# Patient Record
Sex: FEMALE | Race: WHITE | URBAN - METROPOLITAN AREA
[De-identification: names, ages, dates, MRNs, and addresses within clinical notes are randomized per-mention and may not be internally consistent; named-entity substitution may affect disease eponyms.]

---

## 2024-09-30 RX ORDER — MIRTAZAPINE 7.5 MG/1
7.5 TABLET, FILM COATED ORAL DAILY
Qty: 90 TABLET | Refills: 3 | OUTPATIENT
Start: 2024-09-30

## 2025-06-16 ENCOUNTER — TELEPHONE (OUTPATIENT)
Facility: CLINIC | Age: 80
End: 2025-06-16

## 2025-06-16 NOTE — TELEPHONE ENCOUNTER
Spoke with patient and scheduled appointment per Dr. Esquivel request via Secure Chat. Provided new address information and advised to bring in new ID and insurance card upon arrival. Patient expressed understanding.

## 2025-06-17 ENCOUNTER — OFFICE VISIT (OUTPATIENT)
Facility: CLINIC | Age: 80
End: 2025-06-17

## 2025-06-17 VITALS — DIASTOLIC BLOOD PRESSURE: 68 MMHG | HEART RATE: 71 BPM | WEIGHT: 122 LBS | SYSTOLIC BLOOD PRESSURE: 100 MMHG

## 2025-06-17 DIAGNOSIS — R55 NEAR SYNCOPE: ICD-10-CM

## 2025-06-17 DIAGNOSIS — R55 SYNCOPE AND COLLAPSE: Primary | ICD-10-CM

## 2025-06-17 PROBLEM — M17.12 OSTEOARTHRITIS OF LEFT KNEE: Status: ACTIVE | Noted: 2019-10-11

## 2025-06-17 PROBLEM — H93.19 TINNITUS: Status: ACTIVE | Noted: 2020-01-08

## 2025-06-17 PROBLEM — M20.42 ACQUIRED HAMMER TOE OF LEFT FOOT: Status: ACTIVE | Noted: 2019-10-11

## 2025-06-17 PROBLEM — F41.9 ANXIETY: Status: ACTIVE | Noted: 2018-05-10

## 2025-06-17 PROBLEM — G89.29 CHRONIC MIDLINE LOW BACK PAIN WITHOUT SCIATICA: Status: ACTIVE | Noted: 2021-03-03

## 2025-06-17 PROBLEM — M54.50 CHRONIC MIDLINE LOW BACK PAIN WITHOUT SCIATICA: Status: ACTIVE | Noted: 2021-03-03

## 2025-06-17 PROBLEM — N81.10 CYSTOCELE WITH RECTOCELE: Status: ACTIVE | Noted: 2018-03-22

## 2025-06-17 PROBLEM — E78.00 PURE HYPERCHOLESTEROLEMIA: Status: ACTIVE | Noted: 2018-05-11

## 2025-06-17 PROBLEM — M85.859 OSTEOPENIA OF HIP: Status: ACTIVE | Noted: 2019-07-10

## 2025-06-17 PROBLEM — M81.0 AGE RELATED OSTEOPOROSIS: Status: ACTIVE | Noted: 2018-05-11

## 2025-06-17 PROBLEM — M17.0 ARTHRITIS OF BOTH KNEES: Status: ACTIVE | Noted: 2019-10-11

## 2025-06-17 PROBLEM — N81.6 CYSTOCELE WITH RECTOCELE: Status: ACTIVE | Noted: 2018-03-22

## 2025-06-17 PROBLEM — R33.9 INCOMPLETE EMPTYING OF BLADDER: Status: ACTIVE | Noted: 2018-03-22

## 2025-06-17 PROBLEM — R35.0 URINATION FREQUENCY: Status: ACTIVE | Noted: 2018-03-22

## 2025-06-17 PROBLEM — H81.10 BPV (BENIGN POSITIONAL VERTIGO): Status: ACTIVE | Noted: 2020-01-08

## 2025-06-17 PROBLEM — M19.039 LOCALIZED PRIMARY OSTEOARTHRITIS OF WRIST: Status: ACTIVE | Noted: 2019-10-11

## 2025-06-17 PROCEDURE — 93005 ELECTROCARDIOGRAM TRACING: CPT | Performed by: INTERNAL MEDICINE

## 2025-06-17 PROCEDURE — 99202 OFFICE O/P NEW SF 15 MIN: CPT

## 2025-06-17 PROCEDURE — 1126F AMNT PAIN NOTED NONE PRSNT: CPT | Performed by: INTERNAL MEDICINE

## 2025-06-17 PROCEDURE — 1036F TOBACCO NON-USER: CPT | Performed by: INTERNAL MEDICINE

## 2025-06-17 PROCEDURE — 99203 OFFICE O/P NEW LOW 30 MIN: CPT | Performed by: INTERNAL MEDICINE

## 2025-06-17 PROCEDURE — 1159F MED LIST DOCD IN RCRD: CPT | Performed by: INTERNAL MEDICINE

## 2025-06-17 RX ORDER — ROSUVASTATIN CALCIUM 10 MG/1
10 TABLET, COATED ORAL NIGHTLY
COMMUNITY
Start: 2025-05-29

## 2025-06-17 RX ORDER — CYCLOSPORINE 0.5 MG/ML
1 EMULSION OPHTHALMIC EVERY 12 HOURS
COMMUNITY

## 2025-06-17 RX ORDER — CYANOCOBALAMIN (VITAMIN B-12) 500 MCG
400 TABLET ORAL
COMMUNITY

## 2025-06-17 RX ORDER — PSEUDOEPHEDRINE HCL 30 MG
500 TABLET ORAL DAILY
COMMUNITY

## 2025-06-17 RX ORDER — CLONAZEPAM 0.5 MG/1
0.5 TABLET ORAL 2 TIMES DAILY PRN
COMMUNITY
Start: 2025-04-08

## 2025-06-17 RX ORDER — MIRTAZAPINE 7.5 MG/1
1 TABLET, FILM COATED ORAL
COMMUNITY
Start: 2025-04-03

## 2025-06-17 ASSESSMENT — ENCOUNTER SYMPTOMS
DEPRESSION: 0
LOSS OF SENSATION IN FEET: 0
OCCASIONAL FEELINGS OF UNSTEADINESS: 0

## 2025-06-17 ASSESSMENT — PAIN SCALES - GENERAL: PAINLEVEL_OUTOF10: 0-NO PAIN

## 2025-06-17 ASSESSMENT — PATIENT HEALTH QUESTIONNAIRE - PHQ9
1. LITTLE INTEREST OR PLEASURE IN DOING THINGS: NOT AT ALL
2. FEELING DOWN, DEPRESSED OR HOPELESS: NOT AT ALL
SUM OF ALL RESPONSES TO PHQ9 QUESTIONS 1 AND 2: 0

## 2025-06-17 NOTE — ASSESSMENT & PLAN NOTE
Near syncope and panic attacks  History as noted above.  I would agree with using low-dose beta-blockers to help with patient.  I Have advised her to increase her salt intake to offset the hypotension associated with beta-blockers.  Additionally I would like to assess a TSH and cortisol level as these have not been assessed over the past

## 2025-06-17 NOTE — PROGRESS NOTES
No chief complaint on file.       The patient is a 79-year-old female with a history of osteoporosis and near syncopal episodes.  She was hospitalized at [facility] earlier this year in the setting of a bonafide syncopal episode with trauma to her head.  There is no significant pathology noted on extensive workup while she was in the hospital although there was a question of atrial fibrillation.  This was in the setting of significant tremoring and no clear evidence of atrial fibrillation based on extensive review of her records.  Since her episode earlier this year she has had significant anxiety and palpitations associated with her anxiety.  She also has significant fatigue and lack of stamina fall.  She has had extensive workup and has seen a psychiatrist as well who more recently recommended propranolol for therapy of what he believes is panic attacks post her trauma.  The patient notes that she is extremely sensitive to medications and wishes to take the lowest dose possible has been taking clonazepam with some degree of benefit although incomplete.  She has had a normal echocardiogram.           Active Ambulatory Problems     Diagnosis Date Noted    No Active Ambulatory Problems     Resolved Ambulatory Problems     Diagnosis Date Noted    No Resolved Ambulatory Problems     Past Medical History:   Diagnosis Date    Age-related osteoporosis without current pathological fracture     Benign paroxysmal vertigo, unspecified ear 06/01/2016    Personal history of other diseases of the nervous system and sense organs 01/09/2014    Personal history of other diseases of the nervous system and sense organs     Personal history of other specified conditions 05/03/2016    Personal history of other specified conditions 05/03/2017        Review of Systems   All other systems reviewed and are negative.         No current outpatient medications    Objective     There were no vitals filed for this visit.     Vitals and nursing note  reviewed.   Constitutional:       Appearance: Healthy appearance.   HENT:    Mouth/Throat:      Pharynx: Oropharynx is clear.   Pulmonary:      Effort: Pulmonary effort is normal.      Breath sounds: Normal breath sounds.   Cardiovascular:      PMI at left midclavicular line. Normal rate. Regular rhythm. Normal S1. Normal S2.       Murmurs: There is no murmur.      No gallop.  No click. No rub.   Pulses:     Intact distal pulses.   Edema:     Peripheral edema absent.   Abdominal:      General: Bowel sounds are normal.   Musculoskeletal:      Cervical back: Normal range of motion. Skin:     General: Skin is warm and dry.   Neurological:      General: No focal deficit present.      Mental Status: Alert and oriented to person, place and time.            Lab Review:   No visits with results within 2 Month(s) from this visit.   Latest known visit with results is:   Legacy Encounter on 12/28/2021   Component Date Value    Glucose 12/28/2021 93     Urea Nitrogen 12/28/2021 18     Creatinine 12/28/2021 0.7     Urea Nitrogen/Creatinine* 12/28/2021 25.7 (H)     Sodium 12/28/2021 141     Potassium 12/28/2021 4.4     Chloride 12/28/2021 104     Bicarbonate 12/28/2021 27     Anion Gap 12/28/2021 10     Calcium 12/28/2021 10.1     ESTIMATED GFR 12/28/2021 86     WBC 12/28/2021 5.2     RBC 12/28/2021 4.42     Hemoglobin 12/28/2021 13.4     Hematocrit 12/28/2021 40.6     MCV 12/28/2021 91.9     MCH 12/28/2021 30.3     MCHC 12/28/2021 33.0     RDW-SD 12/28/2021 43.2     RDW-CV 12/28/2021 12.9     Platelets 12/28/2021 241     MPV 12/28/2021 12.1     nRBC 12/28/2021 0     Covid-19 Ab Result 12/28/2021 Positive for anti-SARS-CoV-2-S     Anti-Sars-Cov-2 S 12/28/2021 250.0            ECG:  Normal sinus rhythm at 71 bpm  ms QRS duration 80 ms QTc 390 ms     Assessment/plan:  Near syncope and panic attacks  History as noted above.  I would agree with using low-dose beta-blockers to help with patient.  I Have advised her to increase  her salt intake to offset the hypotension associated with beta-blockers.  Additionally I would like to assess a TSH and cortisol level as these have not been assessed over the past    Problem List Items Addressed This Visit    None     Eddy Esquivel MD